# Patient Record
Sex: MALE | Race: WHITE | NOT HISPANIC OR LATINO | Employment: FULL TIME | URBAN - METROPOLITAN AREA
[De-identification: names, ages, dates, MRNs, and addresses within clinical notes are randomized per-mention and may not be internally consistent; named-entity substitution may affect disease eponyms.]

---

## 2017-08-18 ENCOUNTER — TRANSFERRED RECORDS (OUTPATIENT)
Dept: HEALTH INFORMATION MANAGEMENT | Facility: CLINIC | Age: 47
End: 2017-08-18

## 2020-09-25 ENCOUNTER — TRANSFERRED RECORDS (OUTPATIENT)
Dept: HEALTH INFORMATION MANAGEMENT | Facility: CLINIC | Age: 50
End: 2020-09-25

## 2020-10-20 ENCOUNTER — TRANSFERRED RECORDS (OUTPATIENT)
Dept: HEALTH INFORMATION MANAGEMENT | Facility: CLINIC | Age: 50
End: 2020-10-20

## 2021-01-26 ENCOUNTER — TRANSFERRED RECORDS (OUTPATIENT)
Dept: HEALTH INFORMATION MANAGEMENT | Facility: CLINIC | Age: 51
End: 2021-01-26

## 2021-02-02 ENCOUNTER — TRANSFERRED RECORDS (OUTPATIENT)
Dept: HEALTH INFORMATION MANAGEMENT | Facility: CLINIC | Age: 51
End: 2021-02-02

## 2021-02-03 ENCOUNTER — TRANSFERRED RECORDS (OUTPATIENT)
Dept: HEALTH INFORMATION MANAGEMENT | Facility: CLINIC | Age: 51
End: 2021-02-03

## 2021-02-24 ENCOUNTER — TRANSFERRED RECORDS (OUTPATIENT)
Dept: HEALTH INFORMATION MANAGEMENT | Facility: CLINIC | Age: 51
End: 2021-02-24

## 2021-03-02 ENCOUNTER — MEDICAL CORRESPONDENCE (OUTPATIENT)
Dept: HEALTH INFORMATION MANAGEMENT | Facility: CLINIC | Age: 51
End: 2021-03-02

## 2021-03-02 ENCOUNTER — TRANSFERRED RECORDS (OUTPATIENT)
Dept: HEALTH INFORMATION MANAGEMENT | Facility: CLINIC | Age: 51
End: 2021-03-02

## 2021-03-03 ENCOUNTER — TRANSFERRED RECORDS (OUTPATIENT)
Dept: HEALTH INFORMATION MANAGEMENT | Facility: CLINIC | Age: 51
End: 2021-03-03

## 2021-03-03 ENCOUNTER — MEDICAL CORRESPONDENCE (OUTPATIENT)
Dept: HEALTH INFORMATION MANAGEMENT | Facility: CLINIC | Age: 51
End: 2021-03-03

## 2021-03-05 ENCOUNTER — TRANSCRIBE ORDERS (OUTPATIENT)
Dept: OTHER | Age: 51
End: 2021-03-05

## 2021-03-05 DIAGNOSIS — H91.22 SUDDEN HEARING LOSS, LEFT: Primary | ICD-10-CM

## 2021-03-05 DIAGNOSIS — R42 DIZZINESS: ICD-10-CM

## 2021-03-05 DIAGNOSIS — H93.A2 PULSATILE TINNITUS, LEFT EAR: ICD-10-CM

## 2021-03-05 DIAGNOSIS — H90.3 ASYMMETRIC SNHL (SENSORINEURAL HEARING LOSS): ICD-10-CM

## 2021-03-08 ENCOUNTER — TELEPHONE (OUTPATIENT)
Dept: OTOLARYNGOLOGY | Facility: CLINIC | Age: 51
End: 2021-03-08
Payer: COMMERCIAL

## 2021-03-08 NOTE — TELEPHONE ENCOUNTER
[facility] Call Center    Phone Message    May a detailed message be left on voicemail: no     Reason for Call: Appointment Intake    Referring Provider Name: Terri Carpio PA-C at [facility] EAR, NOSE AND THROAT to Dr. Nicolas  Diagnosis and/or Symptoms:   Sudden hearing loss, left [H91.22]  Asymmetric SNHL (sensorineural hearing loss) [H90.5]  Pulsatile tinnitus, left ear [H93.A2]  Dizziness [R42]    Action Taken: Message routed to:  [facility] ([facility]): CLINIC COORDINATORS-EYE-DENTAL-ENT- [[id number]]    Travel Screening: Not Applicable

## 2021-03-15 ENCOUNTER — TRANSCRIBE ORDERS (OUTPATIENT)
Dept: OTHER | Age: 51
End: 2021-03-15

## 2021-03-15 DIAGNOSIS — H53.2 DIPLOPIA: Primary | ICD-10-CM

## 2021-03-16 ENCOUNTER — TELEPHONE (OUTPATIENT)
Dept: OPHTHALMOLOGY | Facility: CLINIC | Age: 51
End: 2021-03-16

## 2021-03-16 NOTE — TELEPHONE ENCOUNTER
Spoke to patient to determine if having diplopia that goes away with closing either eye, and it does. Ok to schedule with neuro ophthalmology.  Will try to coordinate with otology appointment.      Maddie Menjivar on 3/16/2021 at 4:24 PM

## 2021-03-17 ENCOUNTER — TELEPHONE (OUTPATIENT)
Dept: OPHTHALMOLOGY | Facility: CLINIC | Age: 51
End: 2021-03-17

## 2021-04-06 NOTE — TELEPHONE ENCOUNTER
FUTURE VISIT INFORMATION      FUTURE VISIT INFORMATION:    Date: 4/30/2021    Time: 11:30AM    [place] [facility] [place]FORMATION:    Referring provider:  Terri Carpio PA-C    Referring providers clinic:  [facility] EAR, NOSE AND THROAT       Reason for visit/diagnosis  New per pt-ref, - Sudden hearing loss L,Asymmetric SNHL (sensorineural hearing loss), Pulsatile tinnitus L,  Dizziness, ref-recs in EPIC, per pt-hearing loss for a month or so    RECORDS REQUESTED FROM:       Clinic name Comments Records Status Imaging Status   [facility] EAR, NOSE AND THROAT    3/2/2021 note from Terri Carpio PA-C   Care everywhere     [facility] AUDIOLOGY   03/02/2021 audiogram from Marquita Garcia AU.D   2/3/2021 audiogram from Maki Yancey AU.D    1/26/2021 audiogram from Farzaneh Ferrer AU.RENETTA    9/25/2020 audiogram from Concha Jarvis AU.RENETTA  08/18/2017  Audiogram from Concha Jarvis AU.D  req 4/6/21 - received 4/7/21    [facility] ENT  08/23/2017 note from Pepe Bobo MD  Care everywhere     [facility] Physical  03/08/2021 note from Jorge Mcgregor PT Care everywhere     [facility] URGENT CARE   02/24/2021 urgent care note from Gold Mcarthur MD  Care everywhere     [facility] [place] imaging  3/17/2021 MR Brain   03/03/2021 CT TEMPORAL BONE IACS  - PACS  02/02/2021 CT ANGIO HEAD NECK - PACS  10/20/2020 MR IAC   09/06/2017 MR IAC   11/21/2016 CT HEAD    care everywhere  req 4/6/21 - PACS                       4/6/2021 10:27AM sent a fax to [facility] for images and audios - [facility]   4/7/2021 8:37AM received audios from [facility], waiting for image s- Am[facility]   4/13/2021 10:31AM images received in PACS - [facility]

## 2021-04-08 ENCOUNTER — OFFICE VISIT (OUTPATIENT)
Dept: OPHTHALMOLOGY | Facility: CLINIC | Age: 51
End: 2021-04-08
Attending: INTERNAL MEDICINE
Payer: COMMERCIAL

## 2021-04-08 DIAGNOSIS — H53.2 DOUBLE VISION: ICD-10-CM

## 2021-04-08 DIAGNOSIS — H53.2 DIPLOPIA: ICD-10-CM

## 2021-04-08 DIAGNOSIS — H53.10 SUBJECTIVE VISUAL DISTURBANCE: ICD-10-CM

## 2021-04-08 PROCEDURE — 999N000103 HC STATISTIC NO CHARGE FACILITY FEE

## 2021-04-08 PROCEDURE — 99204 OFFICE O/P NEW MOD 45 MIN: CPT | Performed by: OPHTHALMOLOGY

## 2021-04-08 RX ORDER — CITALOPRAM HYDROBROMIDE 10 MG/1
TABLET ORAL
COMMUNITY
Start: 2021-01-17

## 2021-04-08 ASSESSMENT — CONF VISUAL FIELD
METHOD: COUNTING FINGERS
OD_NORMAL: 1
OS_NORMAL: 1

## 2021-04-08 ASSESSMENT — CUP TO DISC RATIO
OD_RATIO: 0.4
OS_RATIO: 0.5

## 2021-04-08 ASSESSMENT — EXTERNAL EXAM - LEFT EYE: OS_EXAM: NORMAL

## 2021-04-08 ASSESSMENT — SLIT LAMP EXAM - LIDS
COMMENTS: NORMAL
COMMENTS: NORMAL

## 2021-04-08 ASSESSMENT — EXTERNAL EXAM - RIGHT EYE: OD_EXAM: NORMAL

## 2021-04-08 ASSESSMENT — VISUAL ACUITY
OS_SC+: -2
OD_SC+: +1
OS_SC: 20/15
OD_SC: 20/20
METHOD: SNELLEN - LINEAR

## 2021-04-08 ASSESSMENT — TONOMETRY
OD_IOP_MMHG: 17
OS_IOP_MMHG: 17
IOP_METHOD: ICARE S/S

## 2021-04-08 NOTE — PROGRESS NOTES
1.  History of episodic vertical binocular diplopia lasting for up to 15 minutes around February to March of 2021 in the context of a very unusual history of significant hearing loss in the left ear.  He reports that he has near complete left ear hearing loss which has been shown to be sensorineural in etiology according to audiologic testing done outside our system.    Today on neuro-ophthalmology exam the patient has normal alignment in all gaze positions.  There is no correlate on his strabismus exam to his complaint of intermittent vertical diplopia.  Without provocative procedures there was no nystagmus in any gaze position.  The only abnormalities detected on examination is abnormal leftward head thrust test with an abnormal catch-up saccade as well as transient right beating nystagmus following rigorous head shaking.  Both of these findings seem to implicate significant left vestibular dysfunction.    I advised the patient that between his left vestibular dysfunction on examination today as well as left hearing loss I suspect that his formal diagnosis will come from assessment by Dr. Nicolas with neurootology.  While not typical, on occasion I have seen patients with vestibular dysfunction who experienced vertical binocular diplopia and that may be the explanation for his prior symptoms however given his normal exam today that is purely speculative.  I will reach out to Dr. Nicolas to notify her of my exam today just in case she wishes to add in vestibular testing to his upcoming evaluation.  It is plausible that she already had that planned.    I did not make a follow-up appointment, but I would be happy to see the patient back in the future should any new neuro-ophthalmic concern arise.    Morales Cardona is a pleasant 50 year old White male who presents to my neuro-ophthalmology clinic today     HPI:    Patient has had longstanding (back to his time in the ) decrease in the left ear.  Pt has long hx  of tinnitus in their left ear, beginning in 2007 after perforating middle ear while cleaning their ear.  That healed and his hearing returned to his baseline.    In June of 2020, pulsatile tinnitus started and was constant.  In January 2021 he would start get episodes of loud whoosing, other noises, and imbalance. Initially lasted 15 minutes but then began lasting 24 hours. Since February he has had complete hearing loss in the left year with persistent dysequlibrium.    At the end of March patient began experiencing binocular diplopia around the time he started taking promethazine for nausea.  In the morning he would awake with vertical binocular diplopia that would resolve after 15 minutes.  He felt like his eyes would keep up with his head.  No history of diplopia before or since.  His most recent episode of diplopia was March 5, 2021.    Patient seeing Dr. Nicolas on 4/30/21.      April 19 2020 patient fell off a trailer hit the right side of his face with a laceration of the eye.    His recent hearing loss was confirmed to be sensorineural in etiology.    Review of outside testing:  10/20/2021-MRI brain with attention to the internal auditory canals with and without IV contrast  Unremarkable per radiology    My interpretation performed today of outside testing:  I reviewed the images from his October 2020 MRI and did not see any structural correlate no etiology for binocular diplopia    Review of outside clinical notes:  Neurology note from 3/5/21- Heather Arguello MD     In the last 7 days he developed diplopia binocular that occurs 10-15 minutes after awakening. There is vertical image separation. Patient has not had any focal weakness, speech impairment, dysphagia, facial weakness. There have been no tremors, in coordination, ataxia. He has been seen by ENT and had 2 courses of prednisone, 1 in mid January and 1 in late February. He underwent numerous studies including MRI of the brain in October 2020, CT  angiogram of the head and neck in February 2021, and CT of the temporal bone to assess for superior canal dehiscence in March 2021. He had a very thorough laboratory workup including thyroid disease, autoimmune disorders, Lyme, all of which were unremarkable.       Past medical history:  1. Borderline diabetes.  2. Vitamin D deficiency.   3. Sensorineural hearing loss.  4. Low back pain.  5. GERD.  6. History of herpes zoster- 2016- right brow region    Exam:  The patient has normal afferent visual function in both eyes including normal best corrected visual acuity, color vision, confrontation visual fields, and pupillary light responses in both eyes. Sensorimotor exam showed normal motility and alignment and in all gaze directions.  Assessment was made with alternate cover testing as well as single Barton hazel.  On head thrust testing there was an abnormal catch-up saccade when head thrusting to the left side.  The patient also had transient right beating nystagmus lasting 15 to 20 seconds after vigorous head shaking       45 minutes were spent on the date of the encounter by me doing chart review, history and exam, documentation, and further activities as noted above    Complete documentation of historical and exam elements from today's encounter can be found in the full encounter summary report (not reduplicated in this progress note).  I personally obtained the chief complaint(s) and history of present illness.  I confirmed and edited as necessary the review of systems, past medical/surgical history, family history, social history, and examination findings as documented by others; and I examined the patient myself.  I personally reviewed the relevant tests, images, and reports as documented above.  I formulated and edited as necessary the assessment and plan and discussed the findings and management plan with the patient and family     Eliceo Urrutia MD

## 2021-04-08 NOTE — LETTER
2021    RE: Morales Cardona  : 1970  MRN: [id number]    Dear Dr. Nicolas,    Thank you for referring your patient, Morales Cardona, to my neuro-ophthalmology clinic recently.  After a thorough neuro-ophthalmic history and examination, I came to the following conclusions:     1.  History of episodic vertical binocular diplopia lasting for up to 15 minutes around February to 2021 in the context of a very unusual history of significant hearing loss in the left ear.  He reports that he has near complete left ear hearing loss which has been shown to be sensorineural in etiology according to audiologic testing done outside our system.    Today on neuro-ophthalmology exam the patient has normal alignment in all gaze positions.  There is no correlate on his strabismus exam to his complaint of intermittent vertical diplopia.  Without provocative procedures there was no nystagmus in any gaze position.  The only abnormalities detected on examination is abnormal leftward head thrust test with an abnormal catch-up saccade as well as transient right beating nystagmus following rigorous head shaking.  Both of these findings seem to implicate significant left vestibular dysfunction.    I advised the patient that between his left vestibular dysfunction on examination today as well as left hearing loss I suspect that his formal diagnosis will come from assessment by Dr. Nicolas with neurootology.  While not typical, on occasion I have seen patients with vestibular dysfunction who experienced vertical binocular diplopia and that may be the explanation for his prior symptoms however given his normal exam today that is purely speculative.  I will reach out to Dr. Nicolas to notify her of my exam today just in case she wishes to add in vestibular testing to his upcoming evaluation.  It is plausible that she already had that planned.    I did not make a follow-up appointment, but I would be happy to see the patient  back in the future should any new neuro-ophthalmic concern arise.    Morales Cardona is a pleasant 50 year old White male who presents to my neuro-ophthalmology clinic today     HPI:    Patient has had longstanding (back to his time in the ) decrease in the left ear.  Pt has long hx of tinnitus in their left ear, beginning in 2007 after perforating middle ear while cleaning their ear.  That healed and his hearing returned to his baseline.    In June of 2020, pulsatile tinnitus started and was constant.  In January 2021 he would start get episodes of loud whoosing, other noises, and imbalance. Initially lasted 15 minutes but then began lasting 24 hours. Since February he has had complete hearing loss in the left year with persistent dysequlibrium.    At the end of March patient began experiencing binocular diplopia around the time he started taking promethazine for nausea.  In the morning he would awake with vertical binocular diplopia that would resolve after 15 minutes.  He felt like his eyes would keep up with his head.  No history of diplopia before or since.  His most recent episode of diplopia was March 5, 2021.    Patient seeing Dr. Nicolas on 4/30/21.      April 19 2020 patient fell off a trailer hit the right side of his face with a laceration of the eye.    His recent hearing loss was confirmed to be sensorineural in etiology.    Review of outside testing:  10/20/2021-MRI brain with attention to the internal auditory canals with and without IV contrast  Unremarkable per radiology    My interpretation performed today of outside testing:  I reviewed the images from his October 2020 MRI and did not see any structural correlate no etiology for binocular diplopia    Review of outside clinical notes:  Neurology note from 3/5/21- Heather Arguello MD     In the last 7 days he developed diplopia binocular that occurs 10-15 minutes after awakening. There is vertical image separation. Patient has not had any  focal weakness, speech impairment, dysphagia, facial weakness. There have been no tremors, in coordination, ataxia. He has been seen by ENT and had 2 courses of prednisone, 1 in mid January and 1 in late February. He underwent numerous studies including MRI of the brain in October 2020, CT angiogram of the head and neck in February 2021, and CT of the temporal bone to assess for superior canal dehiscence in March 2021. He had a very thorough laboratory workup including thyroid disease, autoimmune disorders, Lyme, all of which were unremarkable.       Past medical history:  1. Borderline diabetes.  2. Vitamin D deficiency.   3. Sensorineural hearing loss.  4. Low back pain.  5. GERD.  6. History of herpes zoster- 2016- right brow region    Exam:  The patient has normal afferent visual function in both eyes including normal best corrected visual acuity, color vision, confrontation visual fields, and pupillary light responses in both eyes. Sensorimotor exam showed normal motility and alignment and in all gaze directions.  Assessment was made with alternate cover testing as well as single Barton hazel.  On head thrust testing there was an abnormal catch-up saccade when head thrusting to the left side.  The patient also had transient right beating nystagmus lasting 15 to 20 seconds after vigorous head shaking      Again, thank you for trusting me with the care of your patient.  For further exam details, please feel free to contact our office for additional records.  If you wish to contact me regarding this patient please email me at [email or web address] or give my clinic a call to arrange a phone conversation.    Sincerely,    Eliceo Urrutia MD  , Neuro-Ophthalmology and Adult Strabismus Surgery  The Laura Zamarripa Chair in Neuro-Ophthalmology  Department of Ophthalmology and Visual Neurosciences  [facility]    DX: vestibulopathy, transient diplopia

## 2021-04-09 ENCOUNTER — TELEPHONE (OUTPATIENT)
Dept: AUDIOLOGY | Facility: CLINIC | Age: 51
End: 2021-04-09

## 2021-04-09 DIAGNOSIS — R42 DIZZINESS: Primary | ICD-10-CM

## 2021-04-09 PROBLEM — H93.12 TINNITUS OF LEFT EAR: Status: ACTIVE | Noted: 2020-09-04

## 2021-04-09 PROBLEM — K29.70 GASTRITIS: Status: ACTIVE | Noted: 2021-04-09

## 2021-04-09 PROBLEM — E78.00 HYPERCHOLESTEREMIA: Status: ACTIVE | Noted: 2021-04-09

## 2021-04-09 PROBLEM — H91.92 HEARING LOSS OF LEFT EAR, UNSPECIFIED HEARING LOSS TYPE: Status: ACTIVE | Noted: 2021-03-08

## 2021-04-09 PROBLEM — F41.9 ANXIETY: Status: ACTIVE | Noted: 2021-04-09

## 2021-04-09 PROBLEM — K44.9 HIATAL HERNIA: Status: ACTIVE | Noted: 2021-04-09

## 2021-04-09 PROBLEM — K57.92 DIVERTICULITIS: Status: ACTIVE | Noted: 2018-02-19

## 2021-04-12 ENCOUNTER — TELEPHONE (OUTPATIENT)
Dept: AUDIOLOGY | Facility: CLINIC | Age: 51
End: 2021-04-12

## 2021-04-12 NOTE — TELEPHONE ENCOUNTER
[facility] Call Center    Phone Message    May a detailed message be left on voicemail: yes     Reason for Call: Other: Pt needs to reschedule 4/14/21 Appt and 4/19/21 Appt unless can be after 12:00 PM , Please call Pt back to discuss    Thank you,  Action Taken: Message routed to:  [facility] ([facility] Audiology    Travel Screening: Not Applicable

## 2021-04-16 DIAGNOSIS — H93.12 TINNITUS OF LEFT EAR: Primary | ICD-10-CM

## 2021-04-29 ENCOUNTER — TELEPHONE (OUTPATIENT)
Dept: OTOLARYNGOLOGY | Facility: CLINIC | Age: 51
End: 2021-04-29

## 2021-04-30 ENCOUNTER — PRE VISIT (OUTPATIENT)
Dept: OTOLARYNGOLOGY | Facility: CLINIC | Age: 51
End: 2021-04-30

## 2021-05-01 ENCOUNTER — HEALTH MAINTENANCE LETTER (OUTPATIENT)
Age: 51
End: 2021-05-01

## 2021-05-07 ENCOUNTER — OFFICE VISIT (OUTPATIENT)
Dept: AUDIOLOGY | Facility: CLINIC | Age: 51
End: 2021-05-07
Payer: COMMERCIAL

## 2021-05-07 DIAGNOSIS — R42 DIZZINESS AND GIDDINESS: Primary | ICD-10-CM

## 2021-05-07 PROCEDURE — 92567 TYMPANOMETRY: CPT | Performed by: AUDIOLOGIST

## 2021-05-07 PROCEDURE — 92584 ELECTROCOCHLEOGRAPHY: CPT | Mod: 52 | Performed by: AUDIOLOGIST

## 2021-05-07 NOTE — PROGRESS NOTES
"AUDIOLOGY REPORT    BACKGROUND INFORMATION: Morales Cardona was seen in Audiology at the [facility] on 5/7/2021 for an electrocochleography (ECochG) evaluation, referred by Dr Phyllis Nicolas MD. Record review reveals documented asymmetric sensorineural hearing loss with audiograms dated back to 2017. In March of 2021 the patient was seen for a hearing evaluation at an outside facility due to a sudden decrease in left hearing; results indicated a complete hearing loss for the left ear.  An MRI October of 2020 and CT scan March of 2021 (following sudden left hearing loss) were essentially unremarkable.     Today the patient confirms that he had experienced a gradual decline in left-ear hearing over the past many years. The patient reports that years ago a mild hearing loss was noted in the left ear during hearing assessments for the . The patient reports that he did not become aware of a difference in hearing in 2017 but underwent the hearing assessment due to the onset of troubling left tinnitus described as episodic mild \"tinkling\".  The patient denies other ear related issues at that time.    The patient reports that he has experienced (rare) episodic vertigo since 2009, mostly illicited by turing his head left. He confirms that he did not experience any ear related issues or changes in hearing during the episodes or at any point until March of 2017. The patient reports that in March of 2017 he did experience a sudden complete hearing loss in the left ear. The patient also reports that at the time of the sudden left hearing loss he started experiencing episodic disequilibrium that fluctuates in severity. The patient noted left ear symptoms of increased pressure and decreased hearing at first with the fluctuations in disequilibrium but reports that the ear symptoms have ceased but now the disequilibrium is constant. The patient reports that he did undergo a " Physical Therapy evaluation and learned some exercises and strategies to help with his disequilibrium and that he does feel he has compensated somewhat since the onset. The patient also reports the onset of hyperacusis since the sudden left hearing loss in March of 2017.     The patient reports that in 2007 he perforated his left eardrum with a curette but reports that after a short period of healing he did not note any decreased hearing, ear symptoms or dizziness. The patient also reports an instance of head trauma in spring of 2020 when he fell forward hitting the right forehead, requiring stitches. The patient confirms that he also did not have any changes in hearing, ear related issues or dizziness related to this instance. The patient also reports a diagnosis of TMJ years ago; he does not remember if this was unilateral or bilateral and he has not had any recent issues related to that diagnosis. The patient also reports a history of migraines that are much less frequent than prior, with the last being over one year ago.    The patient denies history of heart or blood pressure issues, known allergies, previous cancer diagnosis or chemotherapy treatment, dizziness with loud sounds or exertion, autophony, noises with eye blinks or movement of eyes. He denies a history of hearing loss or balance issues in his family.    TEST RESULTS AND PROCEDURES:   Abuse Screening:  Do you feel unsafe at home or work/school? No  Do you feel threatened by someone? No  Does anyone try to keep you from having contact with others, or doing things outside of your home? No  Physical signs of abuse present? No    Electrocochleography (ECochG) testing is performed using an auditory evoked potentials system.  Surface electrodes are placed behind the test ear with extratympanic tymptrode placed in the test ear in order to obtain a near-field recording that measures the response of the cochlear hair cells and auditory nerve in response to  auditory stimuli. The measured response consists of the summating potential (SP) and the cochlear nerve action potential (AP). Abnormalities may take the form of an increased summating potential/compound action potential (SP/AP) ratio (due to an increased summating potential) in patients suspected of Meniere's disease (endolymphatic hydrops) or in those patients who have symptoms of vestibular dysfunction or ear symptoms including asymmetric or fluctuating hearing loss, tinnitus and/or aural fullness. The following can be suggestive of an abnormal EcochG: SP/AP ratio exceeds 0.43.  Tympanograms showed normal eardrum mobility bilaterally. Using a microscope tympanic membranes were visualized.       A two-channel ECochG recording was performed for clicks bilaterally.  Clicks for the right ear showed normal SP/AP ratios.    Clicks for the left ear did not produce repeatable responses, likely due to a documented complete hearing loss for the left ear.         Click SP/AP ratio   Right ear  .263   Left ear  Could not obtain repeatable responses     Abrnormal SP/AP ratios must be greater than .43 for clicks.     SUMMARY AND RECOMMENDATIONS: Today s ECochG revealed normal SP/AP ratios for the right ear and repeatable responses could not be obtained for the left ear to confirm SP/AP ratios, likely due to documented complete hearing loss for the left ear.  Please call this clinic with questions regarding today s results.  Follow-up with Dr. Phyllis Nicolas for medical management.    Jena Oliver.  Licensed Audiologist  [place] #[id number]

## 2021-06-29 ENCOUNTER — TELEPHONE (OUTPATIENT)
Dept: AUDIOLOGY | Facility: CLINIC | Age: 51
End: 2021-06-29

## 2021-06-29 NOTE — TELEPHONE ENCOUNTER
"\"I m calling from the Audiology and Balance Testing department at the . This is just a call to remind you of your upcoming Balance Testing appointment on [Date], and to see if you have any questions or concerns regarding the balance testing you'll be doing. You should have received an itinerary via mail or via [employer], if you are active, that goes over what to expect and explains the dos and don ts both 48 hours before, and the day of. There is a list of medications for you to review on the itinerary that we would like you to stop taking beforehand. If you didn t receive the itinerary or you still have questions, please give our clinic a call at [phone]. Otherwise, we will see you on [Date] starting at [Time].\"    Please send encounter if patient would like to reschedule.  "

## 2021-07-05 ENCOUNTER — OFFICE VISIT (OUTPATIENT)
Dept: AUDIOLOGY | Facility: CLINIC | Age: 51
End: 2021-07-05
Payer: COMMERCIAL

## 2021-07-05 DIAGNOSIS — H83.2X2 VESTIBULAR HYPOFUNCTION OF LEFT EAR: Primary | ICD-10-CM

## 2021-07-05 PROCEDURE — 92567 TYMPANOMETRY: CPT | Performed by: AUDIOLOGIST

## 2021-07-05 PROCEDURE — 92537 CALORIC VSTBLR TEST W/REC: CPT | Performed by: AUDIOLOGIST

## 2021-07-05 PROCEDURE — 92542 POSITIONAL NYSTAGMUS TEST: CPT | Mod: 59 | Performed by: AUDIOLOGIST

## 2021-07-05 PROCEDURE — 92545 OSCILLATING TRACKING TEST: CPT | Mod: 59 | Performed by: AUDIOLOGIST

## 2021-07-05 PROCEDURE — 92541 SPONTANEOUS NYSTAGMUS TEST: CPT | Performed by: AUDIOLOGIST

## 2021-07-05 NOTE — PROGRESS NOTES
"AUDIOLOGY REPORT-BALANCE ASSESSMENT    SUBJECTIVE: Morales Cardona, 51 year old, was seen in Audiology at the [facility] on 7/5/2021, for videonystagmography (VNG), referred by Dr. Phyllis Nicolas. The following case history was collected by Anusha Montoya at electrocochleography evaluation on 5/7/2021:     Record review reveals documented asymmetric sensorineural hearing loss with audiograms dated back to 2017. In March of 2021 the patient was seen for a hearing evaluation at an outside facility due to a sudden decrease in left hearing; results indicated a complete hearing loss for the left ear.  An MRI October of 2020 and CT scan March of 2021 (following sudden left hearing loss) were essentially unremarkable.     The patient confirms that he had experienced a gradual decline in left-ear hearing over the past many years. The patient reports that years ago a mild hearing loss was noted in the left ear during hearing assessments for the . The patient reports that he did not become aware of a difference in hearing in 2017 but underwent the hearing assessment due to the onset of troubling left tinnitus described as episodic mild  \"tinkling\".  The patient denies other ear related issues at that time.    The patient reports that he has experienced (rare) episodic vertigo since 2009, mostly illicited by turing his head left. He confirms that he did not experience any ear related issues or changes in hearing during the episodes or at any point until March of 2017. The patient reports that in March of 2017 he did experience a sudden complete hearing loss in the left ear. The patient also reports that at the time of the sudden left hearing loss he started experiencing episodic disequilibrium that fluctuates in severity. The patient noted left ear symptoms of increased pressure and decreased hearing at first with the fluctuations in disequilibrium but reports that the ear symptoms " have ceased but now the disequilibrium is constant. Patient describes it as feeling drunk and feeling that his eyes cannot catch up when he moves his head quickly. The patient reports that he did undergo a Physical Therapy evaluation and learned some exercises and strategies to help with his disequilibrium and that he does feel he has compensated somewhat since the onset. The patient also reports the onset of hyperacusis since the sudden left hearing loss in March of 2017.     The patient reports that in 2007 he perforated his left eardrum with a curette but reports that after a short period of healing he did not note any decreased hearing, ear symptoms or dizziness. The patient also reports an instance of head trauma in spring of 2020 when he fell forward hitting the right forehead, requiring stitches. The patient confirms that he also did not have any changes in hearing, ear related issues or dizziness related to this instance. The patient also reports a diagnosis of TMJ years ago; he does not remember if this was unilateral or bilateral and he has not had any recent issues related to that diagnosis. The patient also reports a history of migraines that are much less frequent than prior, with the last being over one year ago.The patient denies history of heart or blood pressure issues, known allergies, previous cancer diagnosis or chemotherapy treatment, dizziness with loud sounds or exertion, autophony, noises with eye blinks or movement of eyes. He denies a history of hearing loss or balance issues in his family.    Additional case history information collected today:  Patient reports most recent episode of more severe dizziness was last week. Patient describes his tinnitus as pulsatile in nature. Patient reports a history of bilateral Lasik eye surgery and surgery for a benign lesion in the right eye. He reports 1 episode of blurred vision in February of 2021 lasting 1 week. He reports seeing neurophthalmology  with no abnormal findings. Morales has not taken any antivestibular medications in the past 48 hours.    OBJECTIVE:    Videonystagmography (VNG) testing:  Prescreening:  Tympanograms: normal eardrum mobility bilaterally. Note: this test is completed to determine the status of the middle ear before irrigations are completed.  Ocular range of motion and ocular counter roll: Normal  Cross/cover:Normal  Head Thrust: Negative    Nystagmus Tests:  Gaze-Horizontal with fixation:   Center: Normal   Right: Normal   Left: Normal  Gaze-Vertical with fixation:   Up: Normal   Down: Normal  Gaze with fixation denied   Center: Disconjugate eye movement noted.    Right: Inconsistent beats of mild downbeating (2 deg/sec)   Left: Abnormal: 3 deg/sec downbeating   Up: 3 deg/sec left beating   High Frequency Headshake:   Horizontal: Positive for 22 deg/sec right beating nystagmus. Patient denied dizziness but reported difficulty focusing his eyes post headshake.    Vertical: Positive for 11 deg/sec left beating nystagmus, with disconjugate eye movement noted. No dizziness.     Ashley-Hallpike Head Right: Negative for PC-BPPV. No nystagmus & symptoms   Chester-Hallpike Head Left: Negative for PC-BPPV. No nystagmus & symptoms   Roll Test Head Right: Negative for HC-BPPV. No nystagmus & symptoms   Roll Test Head Left: Negative for HC-BPPV. 1-2 deg/sec left beating nystagmus with no symptoms present.      Positional Testing: Patient denied dizziness throughout testing  Positionals: Supine: 1-2 deg/sec left beating nystagmus  Positionals: Body Right: Normal  Positionals: Body Left: Abnormal: Initially 10 deg/sec left beating which slows and changes to 3 deg/sec left beating and 3 deg/sec down beating with rotary component; not completely suppressed with fixation.   Positionals: Pre-Caloric: 2 deg/sec left beating.     Oculomotor Tests:  Saccades: Normal  Anti-saccades: Normal  Pursuit: Normal    Calorics :  (Tested at 44 degrees and 30 degrees  Celsius for 30 seconds for warm and cool water, respectively):  Right Warm Eye Speed: 17 degrees per second right beating (corrected for spontaneous nystagmus)  Left Warm Eye Speed: 7 degrees per second left beating (corrected for spontaneous nystagmus)  Right Cool Eye Speed: 24 degrees per second left beating (corrected for spontaneous nystagmus)  Left Cool Eye Speed: 4 degrees per second right beating (corrected for spontaneous nystagmus)  Difference between ear: 58% left hypofunction. (Greater than 25% considered clinically significant.)  Fixation Index: 0.02 Normal  Overall caloric test: Abnormal; 58% left hypofunction found.     ASSESSMENT:  1. Indications of central vestibular system involvement noted on today's exam were as follows:   - Downbeating nystagmus in 2/4 Gaze with Fixation Denied and 1/4 Positionals  - Non-fixating left beating nystagmus present during the following tests:   -Roll Test Head Left   -Body Left Positional (direction changing with downbeating/rotary component present)  - Disconjugate eye movement noted during Center Gaze with fixation denied and post-Vertical Headshake    2. Indications of peripheral vestibular system involvement noted on today's exam were as follows:   - Left hypofunction found on Calorics    3. Non-localizing findings on today's exam were as follows:   - Positive Horizontal and Vertical Headshake with strong nystagmus and no symptoms present  - Mild left beating nystagmus present during 1/4 Gaze with fixation denied and 2/4 Positionals  **Cannot rule out left irritative lesion, however in presence of central findings, and no dizziness present, these finding could be central in nature.    PLAN:  Follow-up with Dr. Phyllis Nicolas regarding today's results and for medical management.  Please call this clinic at [phone] with questions regarding these results or recommendations.       Alma Gonzales M.A.   Audiology Doctoral Student #[id number]      I was present  with the patient for the entire Audiology appointment including all procedures/testing performed by the AuD student, and agree with the student s assessment and plan as documented.      Jena Munoz.  Licensed Audiologist  [place] # [id number]

## 2021-07-05 NOTE — Clinical Note
Diagnosis code: dizziness and giddiness  Procedure code: vestibular testing and tympanometry (is this reduced from tymps/reflexes?)

## 2021-10-11 ENCOUNTER — HEALTH MAINTENANCE LETTER (OUTPATIENT)
Age: 51
End: 2021-10-11

## 2022-05-22 ENCOUNTER — HEALTH MAINTENANCE LETTER (OUTPATIENT)
Age: 52
End: 2022-05-22

## 2022-09-25 ENCOUNTER — HEALTH MAINTENANCE LETTER (OUTPATIENT)
Age: 52
End: 2022-09-25

## 2023-06-04 ENCOUNTER — HEALTH MAINTENANCE LETTER (OUTPATIENT)
Age: 53
End: 2023-06-04